# Patient Record
Sex: FEMALE | Race: WHITE | NOT HISPANIC OR LATINO | Employment: UNEMPLOYED | URBAN - METROPOLITAN AREA
[De-identification: names, ages, dates, MRNs, and addresses within clinical notes are randomized per-mention and may not be internally consistent; named-entity substitution may affect disease eponyms.]

---

## 2020-01-01 ENCOUNTER — TELEPHONE (OUTPATIENT)
Dept: VASCULAR SURGERY | Facility: CLINIC | Age: 70
End: 2020-01-01

## 2020-01-01 ENCOUNTER — OFFICE VISIT (OUTPATIENT)
Dept: CARDIAC SURGERY | Facility: CLINIC | Age: 70
End: 2020-01-01
Payer: MEDICARE

## 2020-01-01 VITALS
DIASTOLIC BLOOD PRESSURE: 66 MMHG | BODY MASS INDEX: 28.7 KG/M2 | WEIGHT: 182.88 LBS | SYSTOLIC BLOOD PRESSURE: 135 MMHG | HEART RATE: 68 BPM | HEIGHT: 67 IN

## 2020-01-01 DIAGNOSIS — I25.10 ATHEROSCLEROSIS OF NATIVE CORONARY ARTERY OF NATIVE HEART WITHOUT ANGINA PECTORIS: Primary | ICD-10-CM

## 2020-01-01 DIAGNOSIS — I71.21 ASCENDING AORTIC ANEURYSM: ICD-10-CM

## 2020-01-01 DIAGNOSIS — I25.10 CORONARY ARTERY DISEASE INVOLVING NATIVE CORONARY ARTERY OF NATIVE HEART WITHOUT ANGINA PECTORIS: ICD-10-CM

## 2020-01-01 DIAGNOSIS — Z79.01 LONG TERM (CURRENT) USE OF ANTICOAGULANTS: ICD-10-CM

## 2020-01-01 DIAGNOSIS — I25.10 CORONARY ATHEROSCLEROSIS OF NATIVE CORONARY ARTERY: ICD-10-CM

## 2020-01-01 PROCEDURE — 1125F PR PAIN SEVERITY QUANTIFIED, PAIN PRESENT: ICD-10-PCS | Mod: S$GLB,,, | Performed by: THORACIC SURGERY (CARDIOTHORACIC VASCULAR SURGERY)

## 2020-01-01 PROCEDURE — 1159F MED LIST DOCD IN RCRD: CPT | Mod: S$GLB,,, | Performed by: THORACIC SURGERY (CARDIOTHORACIC VASCULAR SURGERY)

## 2020-01-01 PROCEDURE — 99204 PR OFFICE/OUTPT VISIT, NEW, LEVL IV, 45-59 MIN: ICD-10-PCS | Mod: S$GLB,,, | Performed by: THORACIC SURGERY (CARDIOTHORACIC VASCULAR SURGERY)

## 2020-01-01 PROCEDURE — 99999 PR PBB SHADOW E&M-EST. PATIENT-LVL III: ICD-10-PCS | Mod: PBBFAC,,, | Performed by: THORACIC SURGERY (CARDIOTHORACIC VASCULAR SURGERY)

## 2020-01-01 PROCEDURE — 1125F AMNT PAIN NOTED PAIN PRSNT: CPT | Mod: S$GLB,,, | Performed by: THORACIC SURGERY (CARDIOTHORACIC VASCULAR SURGERY)

## 2020-01-01 PROCEDURE — 1101F PR PT FALLS ASSESS DOC 0-1 FALLS W/OUT INJ PAST YR: ICD-10-PCS | Mod: CPTII,S$GLB,, | Performed by: THORACIC SURGERY (CARDIOTHORACIC VASCULAR SURGERY)

## 2020-01-01 PROCEDURE — 99204 OFFICE O/P NEW MOD 45 MIN: CPT | Mod: S$GLB,,, | Performed by: THORACIC SURGERY (CARDIOTHORACIC VASCULAR SURGERY)

## 2020-01-01 PROCEDURE — 99999 PR PBB SHADOW E&M-EST. PATIENT-LVL III: CPT | Mod: PBBFAC,,, | Performed by: THORACIC SURGERY (CARDIOTHORACIC VASCULAR SURGERY)

## 2020-01-01 PROCEDURE — 1101F PT FALLS ASSESS-DOCD LE1/YR: CPT | Mod: CPTII,S$GLB,, | Performed by: THORACIC SURGERY (CARDIOTHORACIC VASCULAR SURGERY)

## 2020-01-01 PROCEDURE — 1159F PR MEDICATION LIST DOCUMENTED IN MEDICAL RECORD: ICD-10-PCS | Mod: S$GLB,,, | Performed by: THORACIC SURGERY (CARDIOTHORACIC VASCULAR SURGERY)

## 2020-01-01 RX ORDER — LIDOCAINE HYDROCHLORIDE 10 MG/ML
1 INJECTION, SOLUTION EPIDURAL; INFILTRATION; INTRACAUDAL; PERINEURAL ONCE
Status: CANCELLED | OUTPATIENT
Start: 2020-01-01 | End: 2020-01-01

## 2020-01-01 RX ORDER — MUPIROCIN 20 MG/G
OINTMENT TOPICAL
Status: CANCELLED | OUTPATIENT
Start: 2020-01-01

## 2020-01-01 RX ORDER — CHLORHEXIDINE GLUCONATE ORAL RINSE 1.2 MG/ML
10 SOLUTION DENTAL
Status: CANCELLED | OUTPATIENT
Start: 2020-01-01

## 2020-01-01 RX ORDER — RIVAROXABAN 2.5 MG/1
1 TABLET, FILM COATED ORAL 2 TIMES DAILY
COMMUNITY
Start: 2020-01-01

## 2020-01-16 NOTE — TELEPHONE ENCOUNTER
----- Message from Debi Giron sent at 1/16/2020  8:49 AM CST -----  Type: Needs Medical Advice    Who Called:  Patient - jacqueline     Best Call Back Number: [phone]  Additional Information: pt having car problems would like to RS for later today or tomorrow if possible  please contact her directly

## 2020-01-23 PROBLEM — I25.10 CORONARY ARTERY DISEASE INVOLVING NATIVE CORONARY ARTERY OF NATIVE HEART WITHOUT ANGINA PECTORIS: Status: ACTIVE | Noted: 2020-01-01

## 2020-01-23 PROBLEM — I71.21 ASCENDING AORTIC ANEURYSM: Status: ACTIVE | Noted: 2020-01-01

## 2020-01-23 NOTE — PROGRESS NOTES
This patient was referred with coronary artery disease and an ascending thoracic aortic aneurysm greater than 5 cm in greatest transverse dimension.  She has history of peripheral arterial disease and previous angioplasty and stenting in the right lower extremity.  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  She has claudication in both lower extremities.  She has a history of renal artery stenosis as well.  She has been a smoker over the years.  She has COPD. She has had no major recent surgeries.  She does take Xarelto.  She has no other pertinent family social history.    On exam vital signs are stable.    Pupils are equal,round and reactive to light.  Neck is supple.  Chest is equal breath sounds.  Heart is in irregular rate and rhythm.  Abdomen is benign.  Perfusion to the legs and feet seems to be satisfactory.  The coronary angiogram was reviewed.  She has multivessel coronary artery disease.  Surgical bypass is recommended.  She also has an ascending thoracic aortic aneurysm.  This will have to be addressed at the time of coronary bypass.  She seems to be understanding.  This will be arranged in the near future.  She will have to stop Xarelto 5 days before the procedure.

## 2020-01-23 NOTE — LETTER
January 23, 2020      George Talbot MD  [address] [place] [address]           [facility]  [address] [facility] [place] [address]  Phone: [phone]  Fax: [phone]          Patient: Suad Gage   MR Number: [id number]   YOB: 1950   Date of Visit: 1/23/2020       Dear Dr. George Talbot:    Thank you for referring Suad Gage to me for evaluation. Attached you will find relevant portions of my assessment and plan of care.    If you have questions, please do not hesitate to call me. I look forward to following Suad Gage along with you.    Sincerely,    Ambrose Black MD    Enclosure  CC:  No Recipients    If you would like to receive this communication electronically, please contact [email or web address] or [phone] to request more information on [employer] Link access.    For providers and/or their staff who would like to refer a patient to [facility], please contact us through our one-stop-shop provider referral line, [facility], at [phone].    If you feel you have received this communication in error or would no longer like to receive these types of communications, please e-mail [email or web address]

## 2020-02-03 NOTE — TELEPHONE ENCOUNTER
----- Message from Chelo Malone sent at 2/3/2020  8:50 AM CST -----  Contact: Patient  Type: Needs Medical Advice    Who Called:  Patient  Best Call Back Number: [phone]  Additional Information: Patient is calling to inform the office that she went and Saw Dr. Mackey with pulmonary on 1/31/20 and she is now ready to schedule her procedure.Please call back and advise.

## 2020-02-05 NOTE — TELEPHONE ENCOUNTER
Confirmed surgery date of 2/17 at [facility] with pt. Instructed to stop Xarelto 5 days prior to surgery.

## 2020-03-09 PROBLEM — I25.10 CORONARY ATHEROSCLEROSIS OF NATIVE CORONARY ARTERY: Status: ACTIVE | Noted: 2020-01-01

## 2020-05-01 ENCOUNTER — TELEPHONE (OUTPATIENT)
Dept: VASCULAR SURGERY | Facility: CLINIC | Age: 70
End: 2020-05-01

## 2020-05-01 NOTE — TELEPHONE ENCOUNTER
----- Message from Narendra Craig sent at 2020 11:39 AM CDT -----  ..Type:  Patient Returning Call    Who Called: Shawnee Stark (Daughter)  Who Left Message for Patient:  Does the patient know what this is regarding?: document   Would the patient rather a call back or a response via [facility]?  Call back   Best Call Back Number:[phone]  Additional Information: Shawnee Stark (Daughter) is requesting a call from nurse to discuss provider call the [facility] [phone]  for provider to sign patient death certificate

## 2020-06-01 ENCOUNTER — TELEPHONE (OUTPATIENT)
Dept: CARDIOLOGY | Facility: CLINIC | Age: 70
End: 2020-06-01

## 2020-06-01 NOTE — TELEPHONE ENCOUNTER
Rtc and mailbox full and could not leave any messages. In chart Dr Black is to sign death certificate. Cm  ----- Message from Carie Kim sent at 6/1/2020 10:12 AM CDT -----  Contact: Ms. Mallory pts daughter Mobile # [phone]  Ms. Mallory is calling in regards to her wanting to speak with you about her mother's death certificate please. She would like for you to sign her mothers death certificate.

## 2020-06-02 ENCOUNTER — TELEPHONE (OUTPATIENT)
Dept: VASCULAR SURGERY | Facility: CLINIC | Age: 70
End: 2020-06-02

## 2020-06-02 NOTE — TELEPHONE ENCOUNTER
----- Message from Rosalind Horn sent at 6/2/2020 12:08 PM CDT -----  Contact: Shawnee Death Certifcate   Type: Needs Medical Advice  Who Called:  Shawnee [place] Call Back Number: [phone]  Additional Information: Patient daughter is requesting for patient's death Certifcate to be signed.